# Patient Record
Sex: FEMALE | Race: BLACK OR AFRICAN AMERICAN | NOT HISPANIC OR LATINO
[De-identification: names, ages, dates, MRNs, and addresses within clinical notes are randomized per-mention and may not be internally consistent; named-entity substitution may affect disease eponyms.]

---

## 2017-01-12 ENCOUNTER — APPOINTMENT (OUTPATIENT)
Dept: CARDIOLOGY | Facility: CLINIC | Age: 52
End: 2017-01-12

## 2017-01-17 ENCOUNTER — OUTPATIENT (OUTPATIENT)
Dept: OUTPATIENT SERVICES | Facility: HOSPITAL | Age: 52
LOS: 1 days | End: 2017-01-17
Payer: COMMERCIAL

## 2017-01-17 PROCEDURE — 72148 MRI LUMBAR SPINE W/O DYE: CPT | Mod: 26

## 2017-01-17 PROCEDURE — 72141 MRI NECK SPINE W/O DYE: CPT | Mod: 26

## 2017-01-25 ENCOUNTER — OUTPATIENT (OUTPATIENT)
Dept: OUTPATIENT SERVICES | Facility: HOSPITAL | Age: 52
LOS: 1 days | End: 2017-01-25

## 2017-01-25 ENCOUNTER — EMERGENCY (EMERGENCY)
Facility: HOSPITAL | Age: 52
LOS: 1 days | End: 2017-01-25
Payer: MEDICAID

## 2017-01-25 PROCEDURE — 99245 OFF/OP CONSLTJ NEW/EST HI 55: CPT

## 2017-01-25 PROCEDURE — 71010: CPT | Mod: 26

## 2017-01-25 PROCEDURE — 99285 EMERGENCY DEPT VISIT HI MDM: CPT

## 2017-01-26 ENCOUNTER — OUTPATIENT (OUTPATIENT)
Dept: OUTPATIENT SERVICES | Facility: HOSPITAL | Age: 52
LOS: 1 days | End: 2017-01-26

## 2017-01-26 PROCEDURE — 71010: CPT | Mod: 26

## 2017-01-26 PROCEDURE — 99233 SBSQ HOSP IP/OBS HIGH 50: CPT

## 2017-02-07 ENCOUNTER — APPOINTMENT (OUTPATIENT)
Dept: CARDIOLOGY | Facility: CLINIC | Age: 52
End: 2017-02-07

## 2017-02-08 ENCOUNTER — APPOINTMENT (OUTPATIENT)
Dept: CARDIOLOGY | Facility: CLINIC | Age: 52
End: 2017-02-08

## 2017-03-03 ENCOUNTER — EMERGENCY (EMERGENCY)
Facility: HOSPITAL | Age: 52
LOS: 1 days | End: 2017-03-03
Payer: MEDICAID

## 2017-03-03 PROCEDURE — 99285 EMERGENCY DEPT VISIT HI MDM: CPT

## 2017-03-03 PROCEDURE — 71010: CPT | Mod: 26

## 2017-03-03 PROCEDURE — 70496 CT ANGIOGRAPHY HEAD: CPT | Mod: 26

## 2017-03-03 PROCEDURE — 70498 CT ANGIOGRAPHY NECK: CPT | Mod: 26

## 2017-03-07 ENCOUNTER — APPOINTMENT (OUTPATIENT)
Dept: CARDIOLOGY | Facility: CLINIC | Age: 52
End: 2017-03-07

## 2017-03-08 ENCOUNTER — APPOINTMENT (OUTPATIENT)
Dept: CARDIOLOGY | Facility: CLINIC | Age: 52
End: 2017-03-08

## 2017-03-21 ENCOUNTER — APPOINTMENT (OUTPATIENT)
Dept: CARDIOLOGY | Facility: CLINIC | Age: 52
End: 2017-03-21

## 2017-03-30 ENCOUNTER — OUTPATIENT (OUTPATIENT)
Dept: OUTPATIENT SERVICES | Facility: HOSPITAL | Age: 52
LOS: 1 days | End: 2017-03-30
Payer: MEDICAID

## 2017-03-30 PROCEDURE — 72110 X-RAY EXAM L-2 SPINE 4/>VWS: CPT | Mod: 26

## 2017-04-12 ENCOUNTER — EMERGENCY (EMERGENCY)
Facility: HOSPITAL | Age: 52
LOS: 1 days | End: 2017-04-12
Payer: MEDICAID

## 2017-04-12 PROCEDURE — 93971 EXTREMITY STUDY: CPT | Mod: 26,LT

## 2017-04-12 PROCEDURE — 73630 X-RAY EXAM OF FOOT: CPT | Mod: 26,LT

## 2017-04-12 PROCEDURE — 99284 EMERGENCY DEPT VISIT MOD MDM: CPT | Mod: 25

## 2017-06-07 ENCOUNTER — APPOINTMENT (OUTPATIENT)
Dept: CARDIOLOGY | Facility: CLINIC | Age: 52
End: 2017-06-07

## 2017-06-10 ENCOUNTER — EMERGENCY (EMERGENCY)
Facility: HOSPITAL | Age: 52
LOS: 1 days | End: 2017-06-10
Payer: MEDICAID

## 2017-06-10 PROCEDURE — 70450 CT HEAD/BRAIN W/O DYE: CPT | Mod: 26

## 2017-06-10 PROCEDURE — 99284 EMERGENCY DEPT VISIT MOD MDM: CPT

## 2017-07-27 ENCOUNTER — APPOINTMENT (OUTPATIENT)
Dept: CARDIOLOGY | Facility: CLINIC | Age: 52
End: 2017-07-27
Payer: MEDICAID

## 2017-07-27 PROCEDURE — 99214 OFFICE O/P EST MOD 30 MIN: CPT

## 2017-09-20 ENCOUNTER — EMERGENCY (EMERGENCY)
Facility: HOSPITAL | Age: 52
LOS: 1 days | End: 2017-09-20
Payer: MEDICAID

## 2017-09-20 PROCEDURE — 99285 EMERGENCY DEPT VISIT HI MDM: CPT

## 2017-09-20 PROCEDURE — 71010: CPT | Mod: 26

## 2017-09-20 PROCEDURE — 70450 CT HEAD/BRAIN W/O DYE: CPT | Mod: 26

## 2018-01-11 ENCOUNTER — EMERGENCY (EMERGENCY)
Facility: HOSPITAL | Age: 53
LOS: 1 days | End: 2018-01-11
Payer: MEDICAID

## 2018-01-11 PROCEDURE — 99285 EMERGENCY DEPT VISIT HI MDM: CPT

## 2018-01-11 PROCEDURE — 71045 X-RAY EXAM CHEST 1 VIEW: CPT | Mod: 26

## 2018-01-24 ENCOUNTER — OUTPATIENT (OUTPATIENT)
Dept: OUTPATIENT SERVICES | Facility: HOSPITAL | Age: 53
LOS: 1 days | End: 2018-01-24
Payer: MEDICAID

## 2018-01-24 PROCEDURE — 70549 MR ANGIOGRAPH NECK W/O&W/DYE: CPT | Mod: 26

## 2018-01-24 PROCEDURE — 70551 MRI BRAIN STEM W/O DYE: CPT | Mod: 26

## 2018-01-24 PROCEDURE — 70544 MR ANGIOGRAPHY HEAD W/O DYE: CPT | Mod: 26,59

## 2018-03-24 ENCOUNTER — EMERGENCY (EMERGENCY)
Facility: HOSPITAL | Age: 53
LOS: 1 days | End: 2018-03-24
Payer: MEDICAID

## 2018-03-24 PROCEDURE — 71046 X-RAY EXAM CHEST 2 VIEWS: CPT | Mod: 26

## 2018-03-24 PROCEDURE — 99285 EMERGENCY DEPT VISIT HI MDM: CPT

## 2018-03-27 ENCOUNTER — OUTPATIENT (OUTPATIENT)
Dept: OUTPATIENT SERVICES | Facility: HOSPITAL | Age: 53
LOS: 1 days | End: 2018-03-27
Payer: MEDICAID

## 2018-03-27 PROCEDURE — 72141 MRI NECK SPINE W/O DYE: CPT | Mod: 26

## 2018-07-16 ENCOUNTER — RECORD ABSTRACTING (OUTPATIENT)
Age: 53
End: 2018-07-16

## 2018-07-27 ENCOUNTER — APPOINTMENT (OUTPATIENT)
Dept: CARDIOLOGY | Facility: CLINIC | Age: 53
End: 2018-07-27

## 2018-10-19 ENCOUNTER — OUTPATIENT (OUTPATIENT)
Dept: OUTPATIENT SERVICES | Facility: HOSPITAL | Age: 53
LOS: 1 days | End: 2018-10-19
Payer: MEDICAID

## 2018-10-19 PROBLEM — Z86.79 HISTORY OF CORONARY STENOSIS: Status: RESOLVED | Noted: 2018-07-16 | Resolved: 2018-10-19

## 2018-10-19 PROBLEM — Z98.890 HISTORY OF CARDIAC CATH: Status: RESOLVED | Noted: 2018-07-16 | Resolved: 2018-10-19

## 2018-10-19 PROBLEM — Z86.79 HISTORY OF HYPERTENSION: Status: RESOLVED | Noted: 2018-07-16 | Resolved: 2018-10-19

## 2018-10-19 PROBLEM — Z84.1 FAMILY HISTORY OF RENAL FAILURE: Status: ACTIVE | Noted: 2018-07-16

## 2018-10-19 PROCEDURE — 71100 X-RAY EXAM RIBS UNI 2 VIEWS: CPT | Mod: 26,RT

## 2018-10-23 ENCOUNTER — APPOINTMENT (OUTPATIENT)
Dept: CARDIOLOGY | Facility: CLINIC | Age: 53
End: 2018-10-23

## 2018-10-23 DIAGNOSIS — Z86.79 PERSONAL HISTORY OF OTHER DISEASES OF THE CIRCULATORY SYSTEM: ICD-10-CM

## 2018-10-23 DIAGNOSIS — Z84.1 FAMILY HISTORY OF DISORDERS OF KIDNEY AND URETER: ICD-10-CM

## 2018-10-23 DIAGNOSIS — Z98.890 OTHER SPECIFIED POSTPROCEDURAL STATES: ICD-10-CM

## 2018-11-24 ENCOUNTER — EMERGENCY (EMERGENCY)
Facility: HOSPITAL | Age: 53
LOS: 1 days | End: 2018-11-24
Payer: MEDICAID

## 2018-11-24 PROCEDURE — 70450 CT HEAD/BRAIN W/O DYE: CPT | Mod: 26

## 2018-11-24 PROCEDURE — 99284 EMERGENCY DEPT VISIT MOD MDM: CPT

## 2018-12-04 ENCOUNTER — APPOINTMENT (OUTPATIENT)
Dept: CARDIOLOGY | Facility: CLINIC | Age: 53
End: 2018-12-04

## 2019-03-20 ENCOUNTER — EMERGENCY (EMERGENCY)
Facility: HOSPITAL | Age: 54
LOS: 1 days | End: 2019-03-20
Admitting: EMERGENCY MEDICINE
Payer: MEDICAID

## 2019-03-20 PROCEDURE — 93010 ELECTROCARDIOGRAM REPORT: CPT

## 2019-03-20 PROCEDURE — 71045 X-RAY EXAM CHEST 1 VIEW: CPT | Mod: 26

## 2019-03-20 PROCEDURE — 99285 EMERGENCY DEPT VISIT HI MDM: CPT

## 2019-04-12 ENCOUNTER — APPOINTMENT (OUTPATIENT)
Dept: CARDIOLOGY | Facility: CLINIC | Age: 54
End: 2019-04-12
Payer: MEDICAID

## 2019-04-12 ENCOUNTER — NON-APPOINTMENT (OUTPATIENT)
Age: 54
End: 2019-04-12

## 2019-04-12 VITALS
WEIGHT: 201 LBS | HEIGHT: 62 IN | DIASTOLIC BLOOD PRESSURE: 88 MMHG | SYSTOLIC BLOOD PRESSURE: 142 MMHG | OXYGEN SATURATION: 98 % | BODY MASS INDEX: 36.99 KG/M2 | HEART RATE: 86 BPM

## 2019-04-12 PROCEDURE — 93000 ELECTROCARDIOGRAM COMPLETE: CPT

## 2019-04-12 PROCEDURE — 99214 OFFICE O/P EST MOD 30 MIN: CPT

## 2019-04-12 RX ORDER — ASPIRIN 81 MG
81 TABLET, DELAYED RELEASE (ENTERIC COATED) ORAL
Refills: 0 | Status: ACTIVE | COMMUNITY

## 2019-05-03 NOTE — HISTORY OF PRESENT ILLNESS
[FreeTextEntry1] : 53 year old female with PMH CAD, with known occluded RCA, with L to R collaterals, known occluded L cervical ICA stenosis on medical RX, follows with Dr. Voss regularly for neurovascular disease, HL, DM.\par \par She presents in follow up after recent ER visit.\par \par Relates a 5 week history of CP, left sided, radiating to left arm, and substernal heart burn sensation, occurring with eating/drinking and with exertion.  She denies assoc. SOB, or any palpitations, dizziness, edema, PND, orthopnea. \par \par Apparenlty ACS was ruled out with negative troponins and no EKG changes. \par \par EKG today reveals NSR with IRBBB. \par \par She is currently not being treated for HL, CAD or PVD with statins, which are certainly recommended. \par \par HTN not ideally controlled, not currently on medications, was elevated in the ER.

## 2019-05-03 NOTE — ADDENDUM
[FreeTextEntry1] : Please note the patient was reviewed with MARCELLUS Lockwood.\par I was physically present during the service of the patient\par I was directly involved in the management plan and recommendations of care provided to the patient. \par I personally reviewed the history and physical exam and examination as documented by the PA above.\par 04/12/2019\par \par

## 2019-05-03 NOTE — ASSESSMENT
[FreeTextEntry1] : 53 year old female with above PMH and the following recommendations:\par \par CAD, known occluded RCA with collaterals, and recent CP. Recommend echocardiography to detail cardiac structure and function, as well as nuclear stress testing to rule out ischemia. \par \par She should be on statins, but declines today. We discussed the importance of treating lipids given her hx and the need for secondary prevention. \par \par Will remain on DAPT.\par \par HTN will start Amlodipine 10 mg today.\par \par DM, managed by her PCP. \par \par Continue follow up with Dr. Voss for carotid artery atherosclerosis. \par \par Follow up after testing.

## 2019-05-03 NOTE — PHYSICAL EXAM
[Normal Appearance] : normal appearance [General Appearance - Well Developed] : well developed [No Deformities] : no deformities [Well Groomed] : well groomed [General Appearance - Well Nourished] : well nourished [General Appearance - In No Acute Distress] : no acute distress [Eyelids - No Xanthelasma] : the eyelids demonstrated no xanthelasmas [Normal Conjunctiva] : the conjunctiva exhibited no abnormalities [Normal Oral Mucosa] : normal oral mucosa [No Oral Pallor] : no oral pallor [No Oral Cyanosis] : no oral cyanosis [Normal Jugular Venous A Waves Present] : normal jugular venous A waves present [No Jugular Venous Campos A Waves] : no jugular venous campos A waves [Normal Jugular Venous V Waves Present] : normal jugular venous V waves present [Exaggerated Use Of Accessory Muscles For Inspiration] : no accessory muscle use [Respiration, Rhythm And Depth] : normal respiratory rhythm and effort [Heart Rate And Rhythm] : heart rate and rhythm were normal [Auscultation Breath Sounds / Voice Sounds] : lungs were clear to auscultation bilaterally [Heart Sounds] : normal S1 and S2 [Murmurs] : no murmurs present [Abdomen Soft] : soft [Abdomen Tenderness] : non-tender [Abdomen Mass (___ Cm)] : no abdominal mass palpated [Abnormal Walk] : normal gait [Nail Clubbing] : no clubbing of the fingernails [Gait - Sufficient For Exercise Testing] : the gait was sufficient for exercise testing [Petechial Hemorrhages (___cm)] : no petechial hemorrhages [Cyanosis, Localized] : no localized cyanosis [] : no rash [Skin Color & Pigmentation] : normal skin color and pigmentation [No Venous Stasis] : no venous stasis [Skin Lesions] : no skin lesions [No Xanthoma] : no  xanthoma was observed [No Skin Ulcers] : no skin ulcer [Oriented To Time, Place, And Person] : oriented to person, place, and time [Mood] : the mood was normal [Affect] : the affect was normal [No Anxiety] : not feeling anxious

## 2019-05-16 ENCOUNTER — APPOINTMENT (OUTPATIENT)
Dept: CARDIOLOGY | Facility: CLINIC | Age: 54
End: 2019-05-16

## 2019-06-21 ENCOUNTER — APPOINTMENT (OUTPATIENT)
Dept: CARDIOLOGY | Facility: CLINIC | Age: 54
End: 2019-06-21

## 2019-07-11 ENCOUNTER — MEDICATION RENEWAL (OUTPATIENT)
Age: 54
End: 2019-07-11

## 2019-07-29 ENCOUNTER — APPOINTMENT (OUTPATIENT)
Dept: CARDIOLOGY | Facility: CLINIC | Age: 54
End: 2019-07-29
Payer: MEDICAID

## 2019-07-29 PROCEDURE — 78452 HT MUSCLE IMAGE SPECT MULT: CPT

## 2019-07-29 PROCEDURE — 93306 TTE W/DOPPLER COMPLETE: CPT

## 2019-07-29 PROCEDURE — A9502: CPT

## 2019-07-29 PROCEDURE — 93015 CV STRESS TEST SUPVJ I&R: CPT

## 2019-08-02 ENCOUNTER — APPOINTMENT (OUTPATIENT)
Dept: CARDIOLOGY | Facility: CLINIC | Age: 54
End: 2019-08-02
Payer: MEDICAID

## 2019-08-02 ENCOUNTER — NON-APPOINTMENT (OUTPATIENT)
Age: 54
End: 2019-08-02

## 2019-08-02 VITALS
HEIGHT: 63 IN | BODY MASS INDEX: 35.79 KG/M2 | OXYGEN SATURATION: 98 % | HEART RATE: 99 BPM | DIASTOLIC BLOOD PRESSURE: 80 MMHG | SYSTOLIC BLOOD PRESSURE: 140 MMHG | WEIGHT: 202 LBS

## 2019-08-02 PROCEDURE — 93000 ELECTROCARDIOGRAM COMPLETE: CPT

## 2019-08-02 PROCEDURE — 99214 OFFICE O/P EST MOD 30 MIN: CPT

## 2019-08-02 NOTE — ADDENDUM
[FreeTextEntry1] : Please note the patient was reviewed with the PA.\par I was physically present during the service of the patient\par I was directly involved in the management plan and recommendations of care provided to the patient. \par I personally reviewed the history and physical exam and plan as documented by the PA above.\par \par Rodney Thorpe DO, FACC, RPVI\par Cardiologist\par 8/2/2019\par \par

## 2019-08-02 NOTE — PHYSICAL EXAM
[General Appearance - Well Developed] : well developed [Normal Appearance] : normal appearance [Well Groomed] : well groomed [General Appearance - Well Nourished] : well nourished [No Deformities] : no deformities [General Appearance - In No Acute Distress] : no acute distress [Normal Conjunctiva] : the conjunctiva exhibited no abnormalities [Eyelids - No Xanthelasma] : the eyelids demonstrated no xanthelasmas [Normal Oral Mucosa] : normal oral mucosa [No Oral Pallor] : no oral pallor [No Oral Cyanosis] : no oral cyanosis [Normal Jugular Venous A Waves Present] : normal jugular venous A waves present [Normal Jugular Venous V Waves Present] : normal jugular venous V waves present [No Jugular Venous Campos A Waves] : no jugular venous campos A waves [Exaggerated Use Of Accessory Muscles For Inspiration] : no accessory muscle use [Respiration, Rhythm And Depth] : normal respiratory rhythm and effort [Heart Rate And Rhythm] : heart rate and rhythm were normal [Auscultation Breath Sounds / Voice Sounds] : lungs were clear to auscultation bilaterally [Heart Sounds] : normal S1 and S2 [Murmurs] : no murmurs present [Abdomen Tenderness] : non-tender [Abdomen Soft] : soft [Abdomen Mass (___ Cm)] : no abdominal mass palpated [Gait - Sufficient For Exercise Testing] : the gait was sufficient for exercise testing [Abnormal Walk] : normal gait [Nail Clubbing] : no clubbing of the fingernails [Cyanosis, Localized] : no localized cyanosis [Petechial Hemorrhages (___cm)] : no petechial hemorrhages [Skin Color & Pigmentation] : normal skin color and pigmentation [] : no rash [No Venous Stasis] : no venous stasis [Skin Lesions] : no skin lesions [No Xanthoma] : no  xanthoma was observed [No Skin Ulcers] : no skin ulcer [Affect] : the affect was normal [Oriented To Time, Place, And Person] : oriented to person, place, and time [Mood] : the mood was normal [No Anxiety] : not feeling anxious

## 2019-08-02 NOTE — HISTORY OF PRESENT ILLNESS
[FreeTextEntry1] : 53 year old female with PMH CAD, with known occluded RCA, with L to R collaterals, known occluded L cervical ICA stenosis on medical RX, follows with Dr. Voss regularly for neurovascular disease, HL, DM.\par \par She presents in follow up after recent cardiac testing. \par \par Relates a  continued several month history of CP, left sided, radiating to left arm, and substernal heart burn sensation, occurring with eating/drinking and with exertion. Lasts up to 10 minutes at times.  She denies assoc. SOB, or any palpitations, dizziness, edema, PND, orthopnea. \par \par Was seen in the ER a few months ago and at that time ACS was ruled out with negative troponins and no EKG changes. \par \par EKG today reveals NSR with IRBBB, and NSSTT's (similar to EKG 2016). \par \par Nuclear stress testing was performed on 7-29-19 which revealed HTN with short duration of exercise, which could not be completed, study was converted to Lexiscan stress. Negative for ischemia overall. There was a small area of anterior wall defect that resolved with prone imaging. \par \par Echocardiogram 7-29-19 revealed normal LV function and wall motion, with borderline PHTN. Calcified nodule on MR. Mild LVH.\par \par She is currently being treated for HL, CAD or PVD on Atorvastatin 80 mg daily. \par \par HTN not ideally controlled, but improved. \par \par She is due to see Dr. Voss for neurovascular follow up of carotid disease.

## 2019-08-02 NOTE — ASSESSMENT
[FreeTextEntry1] : 53 year old female with above PMH and the following recommendations:\par \par CAD, known occluded RCA with collaterals, and intermittent CP. Echocardiogram revealed stable LV function and there was no ischemia see on nuclear stress testing.\par \par Given her continued symptoms I discussed continued optimal medical management vs invasive investigation. She would like to pursue medical therapy. Will start Metoprolol 25 mg daily and Lisinopril 2.5 mg daily. If she continues to be symptomatic can consider long acting nitrates. If she has progressive symptoms or chest pain that lasts >15 min, to seek emergent evaluation.  \par \par Repeat BMP in 2 weeks and short term follow up in one month or as needed. \par Will remain on DAPT and statins. Periodic surveillance of lipids and CMP. \par \par HTN will start Amlodipine 10 mg today.\par \par DM, managed by her PCP. \par \par Continue follow up with Dr. Voss for carotid artery atherosclerosis. \par \par Follow up 1 month.

## 2019-08-27 ENCOUNTER — APPOINTMENT (OUTPATIENT)
Dept: CARDIOLOGY | Facility: CLINIC | Age: 54
End: 2019-08-27

## 2019-08-30 ENCOUNTER — RX CHANGE (OUTPATIENT)
Age: 54
End: 2019-08-30

## 2019-09-03 ENCOUNTER — RX CHANGE (OUTPATIENT)
Age: 54
End: 2019-09-03

## 2019-10-16 ENCOUNTER — APPOINTMENT (OUTPATIENT)
Dept: CARDIOLOGY | Facility: CLINIC | Age: 54
End: 2019-10-16
Payer: MEDICAID

## 2019-10-16 VITALS
BODY MASS INDEX: 34.73 KG/M2 | DIASTOLIC BLOOD PRESSURE: 88 MMHG | HEIGHT: 63 IN | WEIGHT: 196 LBS | SYSTOLIC BLOOD PRESSURE: 168 MMHG | HEART RATE: 74 BPM | OXYGEN SATURATION: 99 %

## 2019-10-16 DIAGNOSIS — R07.9 CHEST PAIN, UNSPECIFIED: ICD-10-CM

## 2019-10-16 PROCEDURE — 93000 ELECTROCARDIOGRAM COMPLETE: CPT

## 2019-10-16 RX ORDER — METOPROLOL SUCCINATE 25 MG/1
25 TABLET, EXTENDED RELEASE ORAL DAILY
Qty: 90 | Refills: 3 | Status: DISCONTINUED | COMMUNITY
Start: 2019-08-02 | End: 2019-10-16

## 2019-10-16 RX ORDER — AMLODIPINE BESYLATE 10 MG/1
10 TABLET ORAL DAILY
Qty: 90 | Refills: 1 | Status: DISCONTINUED | COMMUNITY
Start: 2019-04-12 | End: 2019-10-16

## 2019-10-16 NOTE — PHYSICAL EXAM
[General Appearance - Well Developed] : well developed [Normal Appearance] : normal appearance [Well Groomed] : well groomed [General Appearance - Well Nourished] : well nourished [No Deformities] : no deformities [General Appearance - In No Acute Distress] : no acute distress [Normal Conjunctiva] : the conjunctiva exhibited no abnormalities [Eyelids - No Xanthelasma] : the eyelids demonstrated no xanthelasmas [Normal Oral Mucosa] : normal oral mucosa [No Oral Pallor] : no oral pallor [No Oral Cyanosis] : no oral cyanosis [Normal Jugular Venous A Waves Present] : normal jugular venous A waves present [Normal Jugular Venous V Waves Present] : normal jugular venous V waves present [No Jugular Venous Campos A Waves] : no jugular venous campos A waves [] : no respiratory distress [Respiration, Rhythm And Depth] : normal respiratory rhythm and effort [Exaggerated Use Of Accessory Muscles For Inspiration] : no accessory muscle use [Auscultation Breath Sounds / Voice Sounds] : lungs were clear to auscultation bilaterally [Heart Rate And Rhythm] : heart rate and rhythm were normal [Heart Sounds] : normal S1 and S2 [Murmurs] : no murmurs present

## 2019-10-16 NOTE — ASSESSMENT
[FreeTextEntry1] : Hypertension: The patient reports that she was set this up in the hospital because she was found a blood pressure in excess of 200 systolic. She states her medications were not changed. She states she was advised to see me for pharmacologic adjustment. She was in the hospital for approximately 2 days. Today increase lisinopril from 2.5 mg daily up to 5 mg daily. A basic metabolic panel has been arranged for one weeks time.\par \par Chest discomfort: None recently. \par \par \par Peripheral vascular disease: Patient has known cervical stenosis, maintained on asa/statin\par \par CAD: Patient has an occluded RCA. The RCA small nondominant and not PCI target.

## 2019-10-16 NOTE — HISTORY OF PRESENT ILLNESS
[FreeTextEntry1] : Hypertension: The patient reports that she was set this up in the hospital because she was found a blood pressure in excess of 200 systolic. She states her medications were not changed. She states she was advised to see me for pharmacologic adjustment. She was in the hospital for approximately 2 days. Today increase lisinopril from 2.5 mg daily up to 5 mg daily. A basic metabolic panel has been arranged for one weeks time.\par \par Chest discomfort: Patient believes his chest discomfort from an aneurysm. Will try to obtain records from [facility].\par \par The patient has not any chest discomfort last few days.\par \par Peripheral vascular disease: Patient has known cervical stenosis.\par \par CAD: Patient has an occluded RCA. The RCA small nondominant and not PCI target.

## 2019-10-28 ENCOUNTER — MEDICATION RENEWAL (OUTPATIENT)
Age: 54
End: 2019-10-28

## 2019-10-30 ENCOUNTER — RECORD ABSTRACTING (OUTPATIENT)
Age: 54
End: 2019-10-30

## 2019-10-31 ENCOUNTER — APPOINTMENT (OUTPATIENT)
Dept: CARDIOLOGY | Facility: CLINIC | Age: 54
End: 2019-10-31
Payer: MEDICAID

## 2019-10-31 VITALS
BODY MASS INDEX: 35.08 KG/M2 | SYSTOLIC BLOOD PRESSURE: 120 MMHG | HEIGHT: 63 IN | OXYGEN SATURATION: 98 % | DIASTOLIC BLOOD PRESSURE: 70 MMHG | WEIGHT: 198 LBS | HEART RATE: 80 BPM

## 2019-10-31 DIAGNOSIS — F17.200 NICOTINE DEPENDENCE, UNSPECIFIED, UNCOMPLICATED: ICD-10-CM

## 2019-10-31 PROCEDURE — 99214 OFFICE O/P EST MOD 30 MIN: CPT

## 2019-10-31 NOTE — ASSESSMENT
[FreeTextEntry1] : Pt. with good exercise tolerance without exertional cp or sob.\par \par Carotid artery disease: Patient has an occluded left carotid artery. Patient follow with vascular surgeon in this regard.\par \par Coronary artery disease occluded RCA. Small vessel. Not PCI target.\par \par Hypertension: Well controlled.\par \par Hyperlipidemia: The patient's most recent cholesterol profile reveals a severely elevated LDL. States she has taken her Lasix therapy less than half the time. The patient is on high-dose statin and zetia.  The need for compliance has been reviewed. The patient states she will be compliant with medications. Repeat LFTs and cholesterol profile have been her in 6 weeks' time.  PCSK 9 I will be considered at next visit.  This has been reviewed with pt.\par \par

## 2019-10-31 NOTE — REASON FOR VISIT
[Follow-Up - Clinic] : a clinic follow-up of [Coronary Artery Disease] : coronary artery disease [FreeTextEntry1] : Pt. with good exercise tolerance without exertional cp or sob.\par \par Carotid artery disease: Patient has an occluded left carotid artery. Patient follow with vascular surgeon in this regard.\par \par Coronary artery disease occluded RCA. Small vessel. Not PCI target.\par \par Hypertension: Well controlled.\par \par Hyperlipidemia: The patient's most recent cholesterol profile reveals a severely elevated LDL. States she has taken her Lasix therapy less than half the time. The patient is on high-dose statin and zetia.  The need for compliance has been reviewed. The patient states she will be compliant with medications. Repeat LFTs and cholesterol profile have been her in 6 weeks' time.  PCSK 9 I will be considered at next visit.  This has been reviewed with pt.

## 2020-01-09 ENCOUNTER — APPOINTMENT (OUTPATIENT)
Dept: CARDIOLOGY | Facility: CLINIC | Age: 55
End: 2020-01-09

## 2020-04-30 ENCOUNTER — TRANSCRIPTION ENCOUNTER (OUTPATIENT)
Age: 55
End: 2020-04-30

## 2020-08-19 ENCOUNTER — NON-APPOINTMENT (OUTPATIENT)
Age: 55
End: 2020-08-19

## 2020-08-19 ENCOUNTER — APPOINTMENT (OUTPATIENT)
Dept: CARDIOLOGY | Facility: CLINIC | Age: 55
End: 2020-08-19
Payer: MEDICAID

## 2020-08-19 VITALS
OXYGEN SATURATION: 98 % | HEIGHT: 63 IN | TEMPERATURE: 97 F | HEART RATE: 91 BPM | WEIGHT: 206 LBS | SYSTOLIC BLOOD PRESSURE: 162 MMHG | DIASTOLIC BLOOD PRESSURE: 90 MMHG | BODY MASS INDEX: 36.5 KG/M2

## 2020-08-19 DIAGNOSIS — E78.5 HYPERLIPIDEMIA, UNSPECIFIED: ICD-10-CM

## 2020-08-19 PROCEDURE — 93000 ELECTROCARDIOGRAM COMPLETE: CPT

## 2020-08-19 PROCEDURE — 99214 OFFICE O/P EST MOD 30 MIN: CPT

## 2020-08-19 RX ORDER — ERGOCALCIFEROL 1.25 MG/1
1.25 MG CAPSULE ORAL
Refills: 0 | Status: DISCONTINUED | COMMUNITY
End: 2020-08-19

## 2020-08-19 NOTE — HISTORY OF PRESENT ILLNESS
[FreeTextEntry1] : CAD: The patient has an occluded RCA.  It is not a PCI target.  Patient has fairly good exercise ability without exertional symptoms however she does have occasional rest chest discomfort.  Is unchanged for several years.  Exercise nuclear stress testing has been arranged to localize her area of ischemia given the occluded RCA.\par \par PVD: The patient has an occluded left carotid artery.  The patient is following with vascular surgery in this regard.\par \par Hyperlipidemia: The patient claims she has been compliant with her atorvastatin and Zetia.  LFTs and cholesterol profile have been arranged.  We will consider PCSK9 inhibition after results are available.\par \par Hypertension: Lisinopril is increased to 10 mg daily.  Basic metabolic panel 1 week.

## 2020-08-19 NOTE — PHYSICAL EXAM
[Normal Appearance] : normal appearance [General Appearance - Well Developed] : well developed [Well Groomed] : well groomed [General Appearance - Well Nourished] : well nourished [No Deformities] : no deformities [General Appearance - In No Acute Distress] : no acute distress [Normal Oral Mucosa] : normal oral mucosa [Normal Conjunctiva] : the conjunctiva exhibited no abnormalities [Eyelids - No Xanthelasma] : the eyelids demonstrated no xanthelasmas [No Oral Pallor] : no oral pallor [No Oral Cyanosis] : no oral cyanosis [Normal Jugular Venous A Waves Present] : normal jugular venous A waves present [Normal Jugular Venous V Waves Present] : normal jugular venous V waves present [No Jugular Venous Campos A Waves] : no jugular venous campos A waves [] : no respiratory distress [Respiration, Rhythm And Depth] : normal respiratory rhythm and effort [Exaggerated Use Of Accessory Muscles For Inspiration] : no accessory muscle use [Auscultation Breath Sounds / Voice Sounds] : lungs were clear to auscultation bilaterally [Heart Rate And Rhythm] : heart rate and rhythm were normal [Murmurs] : no murmurs present [Heart Sounds] : normal S1 and S2

## 2020-09-08 ENCOUNTER — RX CHANGE (OUTPATIENT)
Age: 55
End: 2020-09-08

## 2020-09-09 ENCOUNTER — RX CHANGE (OUTPATIENT)
Age: 55
End: 2020-09-09

## 2020-09-23 ENCOUNTER — EMERGENCY (EMERGENCY)
Facility: HOSPITAL | Age: 55
LOS: 1 days | End: 2020-09-23
Admitting: EMERGENCY MEDICINE
Payer: MEDICAID

## 2020-09-23 PROCEDURE — 99285 EMERGENCY DEPT VISIT HI MDM: CPT

## 2020-09-24 PROCEDURE — 73206 CT ANGIO UPR EXTRM W/O&W/DYE: CPT | Mod: 26

## 2020-09-24 PROCEDURE — 73030 X-RAY EXAM OF SHOULDER: CPT | Mod: 26,RT

## 2020-09-25 ENCOUNTER — APPOINTMENT (OUTPATIENT)
Dept: CARDIOLOGY | Facility: CLINIC | Age: 55
End: 2020-09-25

## 2020-09-29 ENCOUNTER — APPOINTMENT (OUTPATIENT)
Dept: CARDIOLOGY | Facility: CLINIC | Age: 55
End: 2020-09-29

## 2020-10-14 ENCOUNTER — RX CHANGE (OUTPATIENT)
Age: 55
End: 2020-10-14

## 2020-10-14 RX ORDER — EZETIMIBE 10 MG/1
10 TABLET ORAL DAILY
Qty: 90 | Refills: 3 | Status: ACTIVE | COMMUNITY
Start: 2019-10-30 | End: 1900-01-01

## 2021-01-19 ENCOUNTER — EMERGENCY (EMERGENCY)
Facility: HOSPITAL | Age: 56
LOS: 1 days | End: 2021-01-19
Admitting: EMERGENCY MEDICINE
Payer: MEDICAID

## 2021-01-19 PROCEDURE — 71260 CT THORAX DX C+: CPT | Mod: 26

## 2021-01-19 PROCEDURE — 73130 X-RAY EXAM OF HAND: CPT | Mod: 26,LT

## 2021-01-19 PROCEDURE — 73110 X-RAY EXAM OF WRIST: CPT | Mod: 26,LT

## 2021-01-19 PROCEDURE — 99284 EMERGENCY DEPT VISIT MOD MDM: CPT

## 2021-01-19 PROCEDURE — 74177 CT ABD & PELVIS W/CONTRAST: CPT | Mod: 26

## 2021-05-11 ENCOUNTER — TRANSCRIPTION ENCOUNTER (OUTPATIENT)
Age: 56
End: 2021-05-11

## 2021-06-01 RX ORDER — ATORVASTATIN CALCIUM 80 MG/1
80 TABLET, FILM COATED ORAL DAILY
Qty: 90 | Refills: 1 | Status: ACTIVE | COMMUNITY
Start: 2019-08-02 | End: 1900-01-01

## 2021-09-08 ENCOUNTER — EMERGENCY (EMERGENCY)
Facility: HOSPITAL | Age: 56
LOS: 1 days | End: 2021-09-08
Admitting: EMERGENCY MEDICINE
Payer: MEDICAID

## 2021-09-08 PROCEDURE — 70496 CT ANGIOGRAPHY HEAD: CPT | Mod: 26

## 2021-09-08 PROCEDURE — 93010 ELECTROCARDIOGRAM REPORT: CPT

## 2021-09-08 PROCEDURE — 71045 X-RAY EXAM CHEST 1 VIEW: CPT | Mod: 26

## 2021-09-08 PROCEDURE — 70498 CT ANGIOGRAPHY NECK: CPT | Mod: 26

## 2021-09-08 PROCEDURE — 70551 MRI BRAIN STEM W/O DYE: CPT | Mod: 26

## 2021-09-08 PROCEDURE — 99285 EMERGENCY DEPT VISIT HI MDM: CPT

## 2022-03-22 ENCOUNTER — EMERGENCY (EMERGENCY)
Facility: HOSPITAL | Age: 57
LOS: 1 days | Discharge: HOME | End: 2022-03-22
Admitting: EMERGENCY MEDICINE
Payer: MEDICAID

## 2022-03-22 PROCEDURE — 99283 EMERGENCY DEPT VISIT LOW MDM: CPT

## 2022-03-22 PROCEDURE — 93010 ELECTROCARDIOGRAM REPORT: CPT

## 2022-03-24 DIAGNOSIS — E11.9 TYPE 2 DIABETES MELLITUS WITHOUT COMPLICATIONS: ICD-10-CM

## 2022-03-24 DIAGNOSIS — R07.9 CHEST PAIN, UNSPECIFIED: ICD-10-CM

## 2022-03-24 DIAGNOSIS — I10 ESSENTIAL (PRIMARY) HYPERTENSION: ICD-10-CM

## 2022-03-24 DIAGNOSIS — E78.5 HYPERLIPIDEMIA, UNSPECIFIED: ICD-10-CM

## 2022-03-24 DIAGNOSIS — B02.9 ZOSTER WITHOUT COMPLICATIONS: ICD-10-CM

## 2022-08-03 ENCOUNTER — APPOINTMENT (OUTPATIENT)
Dept: CARDIOLOGY | Facility: CLINIC | Age: 57
End: 2022-08-03

## 2022-08-03 VITALS
DIASTOLIC BLOOD PRESSURE: 60 MMHG | WEIGHT: 200 LBS | OXYGEN SATURATION: 96 % | BODY MASS INDEX: 35.44 KG/M2 | SYSTOLIC BLOOD PRESSURE: 108 MMHG | TEMPERATURE: 97.3 F | HEIGHT: 63 IN | HEART RATE: 85 BPM

## 2022-08-03 DIAGNOSIS — I73.9 PERIPHERAL VASCULAR DISEASE, UNSPECIFIED: ICD-10-CM

## 2022-08-03 PROCEDURE — 99214 OFFICE O/P EST MOD 30 MIN: CPT

## 2022-08-03 NOTE — HISTORY OF PRESENT ILLNESS
[FreeTextEntry1] : CAD: The patient underwent invasive coronary angiography several years ago.  There was a small nondominant RCA chronically occluded fed left to right.  Medical therapy was recommended.  The RCA was felt to not be a percutaneous target for revascularization.\par \par The patient has noticed some exertional discomfort in her chest which she describes a sharp-like sensation.  It lasts a few seconds then resolves.  It also occurs at rest and is similar in nature.  The patient has significant left shoulder pain and cannot lift her left arm up.\par \par The patient has significant problems with her hips and knees and will not be able to exercise adequately for diagnostic purposes.  There is no history of asthma.  Pharmacologic nuclear stress testing is the best option to rule out ischemia.\par \par Transthoracic echocardiography has been arranged to rule out pericardial disease.\par \par Peripheral vascular disease: The patient follows with a vascular surgeon.\par \par Hyperlipidemia: The patient states she is treated for hyperlipidemia at her primary care doctor's office.

## 2022-08-24 ENCOUNTER — APPOINTMENT (OUTPATIENT)
Dept: CARDIOLOGY | Facility: CLINIC | Age: 57
End: 2022-08-24

## 2022-09-06 ENCOUNTER — APPOINTMENT (OUTPATIENT)
Dept: CARDIOLOGY | Facility: CLINIC | Age: 57
End: 2022-09-06

## 2022-12-16 RX ORDER — CLOPIDOGREL BISULFATE 75 MG/1
75 TABLET, FILM COATED ORAL DAILY
Qty: 30 | Refills: 0 | Status: ACTIVE | COMMUNITY
Start: 2019-04-12 | End: 1900-01-01

## 2023-04-19 ENCOUNTER — APPOINTMENT (OUTPATIENT)
Dept: CARDIOLOGY | Facility: CLINIC | Age: 58
End: 2023-04-19
Payer: MEDICAID

## 2023-04-19 ENCOUNTER — NON-APPOINTMENT (OUTPATIENT)
Age: 58
End: 2023-04-19

## 2023-04-19 VITALS
HEART RATE: 75 BPM | BODY MASS INDEX: 35.97 KG/M2 | OXYGEN SATURATION: 95 % | HEIGHT: 63 IN | DIASTOLIC BLOOD PRESSURE: 80 MMHG | SYSTOLIC BLOOD PRESSURE: 170 MMHG | WEIGHT: 203 LBS

## 2023-04-19 DIAGNOSIS — R06.00 DYSPNEA, UNSPECIFIED: ICD-10-CM

## 2023-04-19 PROCEDURE — 99214 OFFICE O/P EST MOD 30 MIN: CPT

## 2023-04-19 RX ORDER — METFORMIN HYDROCHLORIDE 500 MG/1
500 TABLET, COATED ORAL TWICE DAILY
Qty: 180 | Refills: 0 | Status: DISCONTINUED | COMMUNITY
Start: 2019-04-12 | End: 2023-04-19

## 2023-04-19 NOTE — REASON FOR VISIT
[FreeTextEntry1] : CAD: The patient has a chronically occluded RCA.  It is fed left to right.  The patient now has significant dyspnea sometimes with mild exertion.  At the time she is dyspneic with moderate exertion.\par \par The patient has significant sciatica as well as burning in her feet which the patient will not be able to exercise adequately for diagnostic purposes because of sciatica and possible neuropathy.  May be neuropathy.   \par \par There is no history of asthma.  Pharmacologic nuclear stress testing is the best option to rule out ischemia.  Transthoracic echocardiography has been arranged to rule out pericardial disease.\par \par Peripheral vascular disease: The patient follows the vascular surgeon in this regard.\par \par Hyperlipidemia: Followed at an outside facility.  The patient is on high-dose statin therapy.

## 2023-05-04 ENCOUNTER — APPOINTMENT (OUTPATIENT)
Dept: CARDIOLOGY | Facility: CLINIC | Age: 58
End: 2023-05-04
Payer: MEDICAID

## 2023-05-04 PROCEDURE — 93306 TTE W/DOPPLER COMPLETE: CPT

## 2023-05-18 ENCOUNTER — APPOINTMENT (OUTPATIENT)
Dept: CARDIOLOGY | Facility: CLINIC | Age: 58
End: 2023-05-18
Payer: MEDICAID

## 2023-05-18 PROCEDURE — A9502: CPT

## 2023-05-18 PROCEDURE — 78452 HT MUSCLE IMAGE SPECT MULT: CPT

## 2023-05-18 PROCEDURE — 93015 CV STRESS TEST SUPVJ I&R: CPT

## 2023-05-22 ENCOUNTER — APPOINTMENT (OUTPATIENT)
Dept: CARDIOLOGY | Facility: CLINIC | Age: 58
End: 2023-05-22
Payer: MEDICAID

## 2023-05-22 VITALS
BODY MASS INDEX: 35.97 KG/M2 | DIASTOLIC BLOOD PRESSURE: 70 MMHG | HEART RATE: 87 BPM | HEIGHT: 63 IN | OXYGEN SATURATION: 99 % | WEIGHT: 203 LBS | SYSTOLIC BLOOD PRESSURE: 172 MMHG

## 2023-05-22 DIAGNOSIS — I25.10 ATHEROSCLEROTIC HEART DISEASE OF NATIVE CORONARY ARTERY W/OUT ANGINA PECTORIS: ICD-10-CM

## 2023-05-22 DIAGNOSIS — I10 ESSENTIAL (PRIMARY) HYPERTENSION: ICD-10-CM

## 2023-05-22 PROCEDURE — 99213 OFFICE O/P EST LOW 20 MIN: CPT

## 2023-05-22 RX ORDER — LISINOPRIL 20 MG/1
20 TABLET ORAL DAILY
Qty: 90 | Refills: 0 | Status: ACTIVE | COMMUNITY
Start: 2019-08-02 | End: 1900-01-01

## 2023-05-22 NOTE — ASSESSMENT
[FreeTextEntry1] : CAD: Patient status post invasive coronary angiography many years ago.  The patient has occluded RCA.  Is fed left to right.  Patient's breathing is fairly good.  She is fairly good exercise capacity without exertional symptoms.  Pharmacologic nuclear stress testing reveals no significant evidence of ischemia.  Continued observation is her best option.\par \par Hypertension: Today we increased her lisinopril from 10 mg daily to 20 mg daily.  Base metabolic panel has been arranged for 1 week's time.  The patient will log her blood pressures at home and bring them to me next visit.  Instructions for up titration have been given.

## 2023-07-10 ENCOUNTER — APPOINTMENT (OUTPATIENT)
Dept: CARDIOLOGY | Facility: CLINIC | Age: 58
End: 2023-07-10

## 2024-02-19 ENCOUNTER — APPOINTMENT (OUTPATIENT)
Dept: RADIOLOGY | Facility: CLINIC | Age: 59
End: 2024-02-19
Payer: MEDICAID

## 2024-02-19 PROCEDURE — 72100 X-RAY EXAM L-S SPINE 2/3 VWS: CPT

## 2025-05-08 ENCOUNTER — APPOINTMENT (OUTPATIENT)
Dept: CARDIOLOGY | Facility: CLINIC | Age: 60
End: 2025-05-08
Payer: SELF-PAY

## 2025-05-08 ENCOUNTER — NON-APPOINTMENT (OUTPATIENT)
Age: 60
End: 2025-05-08

## 2025-05-08 VITALS
OXYGEN SATURATION: 99 % | WEIGHT: 189 LBS | DIASTOLIC BLOOD PRESSURE: 72 MMHG | HEIGHT: 63 IN | SYSTOLIC BLOOD PRESSURE: 130 MMHG | HEART RATE: 96 BPM | BODY MASS INDEX: 33.49 KG/M2

## 2025-05-08 DIAGNOSIS — E78.5 HYPERLIPIDEMIA, UNSPECIFIED: ICD-10-CM

## 2025-05-08 DIAGNOSIS — I25.10 ATHEROSCLEROTIC HEART DISEASE OF NATIVE CORONARY ARTERY W/OUT ANGINA PECTORIS: ICD-10-CM

## 2025-05-08 DIAGNOSIS — R07.9 CHEST PAIN, UNSPECIFIED: ICD-10-CM

## 2025-05-08 DIAGNOSIS — I10 ESSENTIAL (PRIMARY) HYPERTENSION: ICD-10-CM

## 2025-05-08 PROCEDURE — 93000 ELECTROCARDIOGRAM COMPLETE: CPT

## 2025-05-08 PROCEDURE — 99215 OFFICE O/P EST HI 40 MIN: CPT

## 2025-05-08 PROCEDURE — G2211 COMPLEX E/M VISIT ADD ON: CPT

## 2025-05-08 RX ORDER — ROSUVASTATIN CALCIUM 20 MG/1
20 TABLET, FILM COATED ORAL AT BEDTIME
Refills: 0 | Status: ACTIVE | COMMUNITY

## 2025-05-08 RX ORDER — SPIRONOLACTONE 25 MG/1
25 TABLET ORAL DAILY
Qty: 30 | Refills: 1 | Status: ACTIVE | COMMUNITY

## 2025-05-08 RX ORDER — METOPROLOL SUCCINATE 25 MG/1
25 TABLET, EXTENDED RELEASE ORAL
Qty: 1 | Refills: 2 | Status: ACTIVE | COMMUNITY
Start: 2025-05-08 | End: 1900-01-01

## 2025-05-08 RX ORDER — LOSARTAN POTASSIUM 25 MG/1
25 TABLET, FILM COATED ORAL DAILY
Refills: 0 | Status: ACTIVE | COMMUNITY

## 2025-05-08 RX ORDER — AMLODIPINE BESYLATE 10 MG/1
10 TABLET ORAL DAILY
Refills: 0 | Status: ACTIVE | COMMUNITY

## 2025-05-08 RX ORDER — GLIMEPIRIDE 1 MG/1
1 TABLET ORAL DAILY
Refills: 0 | Status: ACTIVE | COMMUNITY

## 2025-06-06 ENCOUNTER — APPOINTMENT (OUTPATIENT)
Dept: CARDIOLOGY | Facility: CLINIC | Age: 60
End: 2025-06-06